# Patient Record
Sex: FEMALE | Race: ASIAN | NOT HISPANIC OR LATINO
[De-identification: names, ages, dates, MRNs, and addresses within clinical notes are randomized per-mention and may not be internally consistent; named-entity substitution may affect disease eponyms.]

---
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
## 2023-01-04 ENCOUNTER — RX RENEWAL (OUTPATIENT)
Age: 18
End: 2023-01-04

## 2023-01-16 NOTE — ED PEDIATRIC TRIAGE NOTE - ACCOMPANIED BY
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 and echocardiogram were recommended. Additionally, a consultation with cardiology was recommended. A referral was previously provided and testing was ordered. The patient again reported having a couple of episodes of tachycardia since her last visit. -- She has had a consultation with cardiology and wore a Holter monitor. --She was counseled to follow-up with cardiology.      Precautions were reviewed with the patient and she was counseled to go to the hospital with persistent or worsening symptoms or the development of any associated chest pain, shortness of breath, lightheadedness, dizziness, and/or syncope. 15. Anemia -- The patient's H/H on 9/26/2022 was noted to be 10.9/31.7. The patient reported having occasional symptoms of lightheadedness. -- A Baseline nutrition panel and thyroid function studies were completed on 9/26/2022. A hemoglobin electrophoresis, reticulocyte count were ordered. Her reticulocyte count was normal.  The hemoglobin electrophoresis was normal.  --The patient previously reported having intermittent symptoms of lightheadedness. She was counseled that this may be related to her anemia. Additional maternal evaluation was recommended with an EKG, echocardiogram, and consultation with cardiology as outlined above. --Precautions were reviewed with the patient. She was counseled to go to the hospital with persistent or worsening symptoms or the development of any chest pain, shortness of breath, tachycardia, or syncope. --Supplement as needed     16. Low vitamin B12 -- The patient's vitamin B12 level was borderline at 306. Individuals with vitamin B12 level between 200 and 400 are increased risk for anemia and side effects related to low vitamin B12. Thus, supplementation is recommended  --Recommend vitamin B12, 1000 mcg daily  --Monitor levels serially  --Repeat nutrition panel (CBC, CMP, magnesium, ferritin, folate, vitamin B12, vitamin D 25 [place]) in 4 weeks, on/after 10/24/2022     --Repeat testing completed 11/9/2022, results included: H/H 11.3/32.8, MCV 92.4, platelet count 434,310, potassium 3.8, creatinine 0.6, calcium 9.6, ALT 10, AST 13, ferritin 26, folate >20, vitamin B12 595, vitamin D 30, hemoglobin A1c 4.8%, TSH 0.998, free T4 0.89, free T3 2.8, uric acid 4.8, , magnesium 1.5, urinalysis contaminated, urine protein creatinine ratio 0.1, urine culture mixed, blood type B-, antibody screen negative. -- The patient's vitamin B12 was improved at 595.   She was counseled to continue her vitamin B12 supplement. --Recommend repeat testing in 4 to 6 weeks, on/after 12/7/2022     -- Repeat testing was completed on 12/16/2022, her results included: H/H 12/35.8, MCV 92.3, bili count 204,000, magnesium 1.7, potassium 4, creatinine 0.5, calcium 9, ALT 29, AST 20, ferritin 23, folate >20, vitamin B12 621, vitamin D 38, globin A1c 5.4%, TSH 1.67, free T4 0.6, free T3 3.1, uric acid 5, , TPO negative, antithyroglobulin antibody negative, urine protein creatinine ratio 0.1, urine culture mixed, hemoglobin electrophoresis pending, reticulocyte count normal.     -- The patient's vitamin B12 level has improved. She was counseled to continue her supplement. --Recommend repeat testing in 4 to 6 weeks, on/after 1/13/2023  --Repeat testing ordered  --Long-term follow-up with PCP for monitoring and management     17. Low ferritin -- The patient's ferritin was low at 29 (considered low if <15 in absence of anemia or <40 in setting of anemia)  --Ferrous sulfate 325 mg BID prescribed  --Monitor levels serially  --Monitor nutrition panel q4-6 weeks (CBC, CMP, magnesium, ferritin, folate, vitamin B12, vitamin D25OH), on/after 10/24/2022     --Repeat testing completed 11/9/2022, ferritin 26. Her H/H was stable at 11.3/32.  -- The patient was counseled to continue her oral iron supplement. --Recommend repeat testing in 4 to 6 weeks, on/after 12/7/2022     --Repeat testing completed 12/16/2022. Her ferritin level was stable at 23. --She was counseled to continue her iron supplement. --Recommend repeat testing in 4 to 6 weeks, on/after 1/13/2023  --Repeat testing ordered  --Follow up with PCP for long term monitoring and management     18.   Low vitamin D -- The patient's vitamin D was low at 31  --Recommend vitamin D3 2000 IU daily  --Monitor levels serially  --Monitor nutrition panel q4-6 weeks (CBC, CMP, magnesium, ferritin, folate, vitamin B12, vitamin D25OH)     --Repeat testing completed 11/9/2022, vitamin D 30  --The patient was encouraged to take her vitamin D supplement.  --Recommend repeat testing in 4 to 6 weeks, on/after 12/7/2022.     -- Repeat testing completed 12/16/2022, her vitamin D level was stable at 38.  She was counseled to continue her vitamin D supplement.  --Recommend repeat testing in 4 to 6 weeks, on/after 1/13/2023  --Repeat testing ordered  --Follow up with PCP for long term monitoring and management     19.  Low magnesium -- The patient's magnesium was mildly decreased at 1.5 (1.6-2.6).  Her potassium was normal at 3.8.  She was prescribed magnesium oxide, 400 mg daily.  Recommend repeat testing with next set of labs.  --Repeat testing completed 12/16/2022.  Her magnesium level was improved at 1.7.  She was counseled to continue her supplement.  --Repeat testing ordered     20.   Hypothyroxinemia -- The patient's lab results were reviewed.  Her free T4 was mildly decreased at 0.89.  Her TSH was normal at 0.998 and free T3 normal at 2.8.  Screening for thyroid peroxidase antibody and antithyroglobulin antibody was previously negative on 9/26/2022.  Counseling was provided to the patient.     --Counseling was previously provided to the patient.  Counseling was reviewed.  She did not have any additional questions or concerns.     Per the American thyroid Association, treatment is not recommended for women with isolated hypothyroxinemia. However, thyroid function study should be monitored closely, every 4 weeks throughout the pregnancy.  --Recommend repeat thyroid function studies in 4 weeks, on/after 12/7/2022     --Repeat testing completed 12/16/2022, her results included: TSH 1.67, free T4 0.86, free T3 3.1.  --Her free T4 is again mildly decreased.  Her TSH and free T3 are normal.  --Recommend repeat testing in 4 to 6 weeks, on/after 1/13/2023.  --Repeat testing ordered  --Long-term follow-up with PCP for monitoring management        21. Pressure with  voiding/dysuria, improved -- The patient previously had complaints of dysuria and increased pressure with voiding. She denied any associated fevers, chills, nausea, vomiting, and or back pain. The patient had a urine culture on 9/26/2022 that was reported as mixed. Secondary to the patient's symptoms, a prescription for Macrobid was provided. Today, the patient again reports that her symptoms have improved. She again reports intermittent symptoms of pressure but feels it is related to the pregnancy. Infection precautions were reviewed. Precautions were again reviewed and the patient was counseled to go to the hospital with persistent or worsening symptoms or the development of any associated fevers, chills, nausea, vomiting, and/or back pain. 22.  Upper respiratory infection, improved -- Previously, on 10/13/2022, the patient had complaints of upper respiratory infection symptoms. She reports that her symptoms started on Tuesday, 10/4/2022. She has complaints of congestion and a nonproductive cough. She also reports having a scratchy throat. She denied having any associated fevers, chills, nausea, and or vomiting. She denied having any loss of taste or smell. At 24 weeks gestation, the patient again had complaints of congestion. She reports her symptoms started 2 days ago. She denied having any associated fevers, chills, nausea, vomiting, chest pain, and/or shortness of breath. She denied having any associated loss of taste or smell. Supportive measures were again reviewed including using Tylenol as needed for pain and fever, saline nasal spray for congestion, Robitussin (plain) for cough, a humidifier or vaporizer, and throat lozenges and/or spray. A handout reviewing medication safety in pregnancy was provided. Today, the patient again reports that her symptoms have improved.      Precautions were reviewed with the patient and she was counseled that if she develops a fever greater than 100.4 F, chest pain and/or shortness of breath to present immediately for evaluation. The patient expressed verbal understanding of this counseling. 23.  Lump under skin -- The patient previously had concerns regarding a small, pea-sized lump along the right side of her abdominal wall. The patient reports that the lump is at the site of a Lovenox injection. The patient reports that her symptoms are stable. The patient was counseled that sometimes small knots or lumps can develop at the site of Lovenox injections. She was counseled to avoid massaging the area after administering the Lovenox. She was counseled to hold pressure after the injection. 24.  Abdominal wall hernia -- The patient again had concerns regarding a possible hernia at the site of her prior ileostomy. She reports that the area occasionally bulges and is sometimes tender. The patient was counseled that she may have a hernia in that area. With persistent or worsening symptoms, I would recommend referral to general surgery for further evaluation. She was counseled that she can wear gentle compression to help minimize the risk for bowel herniation. Precautions were reviewed and she was counseled to present to the hospital with the development of persistent pain, fever, nausea, and or vomiting. The patient was provided with a referral to general surgery. The patient met with Dr. Mia Arauz on 11/3/2022. Per his consultation note, she has a small incisional hernia at the site of her prior ileostomy. No obstruction was noted. Precautions were reviewed. Dr. 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 Based on her history, she may be at increased risk for having a bowel obstruction. Precautions were reviewed. 25. Occasional headaches, stable -- The patient again reports that she is experiencing occasional headaches.   She denies having any associated vision change, chest pain, shortness of breath, nausea, and or vomiting. She denies having the worst headache of her life or any associated neurologic deficits. Today, the patient again reports that her symptoms are stable. The patient was again counseled to stay well-hydrated. She can use Tylenol as needed. She was counseled regarding the use of magnesium oxide 400 mg twice daily and riboflavin 100 mg daily in reducing the frequency and intensity of migraine headaches. Precautions were reviewed, and she was counseled that if she develops the worst headache of her life or a headache with neurological deficits, to present immediately for evaluation. She expressed verbal understanding of this counseling. 26. MTHFR C677T, heterozygote --  The patient had a thrombophilia panel secondary to a history of a DVT. She was found to be heterozygous for MTHFR C677T. Counseling was previously provided regarding the implications and management of this gene mutation in pregnancy. Counseling was reviewed. She did not have any additional questions or concerns. She was counseled that this gene mutation affects folic acid metabolism. It is fairly common and found in 20-30% of the population. The patient was counseled that this condition is no longer thought to be clinically significant. It is generally only a problem if homocysteine levels are elevated. The patient's homocystine level was normal at 5.3 on 9/26/2022. In the past, this gene mutation was thought to be associated with increased obstetric risks including thrombosis, early recurrent pregnancy loss, placental abruption, hypertensive disorders of pregnancy, poor fetal growth, and fetal loss. More recent studies did not report strong associations with these risks. Because this genetic mutation affects folic acid metabolism, there is an increased risk for folic acid deficiency and other nutritional deficiencies in women with this condition.  There is also an increased risk for having a child with an open neural tube defect in women with this mutation, especially if they're homozygous for the C677T mutation. Generally, additional vitamin supplementation is recommended with folic acid or methyl folate, vitamin B6, and vitamin B12. The patient was counseled to take a low-dose aspirin. Fetal growth should be followed serially. She was counseled that there is a 50% chance that she will pass this mutation off to her child(joana). She should relay this information to the pediatrician who cares for her child(joana). She was also encouraged to review this diagnosis with her PCP. 27.  Vaginal discharge, improved -- The patient previously had complaints of increased vaginal discharge during her visit at 22 weeks 3 days. She denied having any associated itching or irritation. She felt that she may have a yeast infection. Thus, a sterile speculum exam was completed on 11/10/2022. Her cervix appeared closed. Copious discharge was noted. A genital culture was collected and noted to be negative. The patient again had complaints of copious vaginal discharge. A sterile speculum exam was repeated on 11/17/2022. Infection screening was completed with GC/chlamydia, Ureaplasma, and a repeat genital culture. --Screening for GC and chlamydia was negative. A genital culture was negative. Screening for Ureaplasma and mycoplasma was pending. Secondary to the continued vaginal discharge and borderline cervical length, the patient was empirically treated with a course of Flagyl. A prescription was previously provided. The patient reported that she tolerated the medication well. The patient again reports that her symptoms are stable/improving. 28.  Borderline cervical length -- The ultrasound results were reviewed with the patient. At 22 weeks 3 days, the patient's cervical length was borderline and measured 2.8-3.5 cm without funneling.   No dynamic change was noted. Secondary to the patient's complaints of increased discharge, A sterile speculum exam was done prior to her transvaginal ultrasound. The patient's cervix was visually closed. Only a general culture was collected. At 23 weeks 3 days, the patient's cervix appeared stable and measured 2.6-2.9 cm without funneling. A sterile speculum exam was repeated at 23 weeks 3 days. The patient's cervix was visually closed. Copious discharge was noted. Infection screening was completed. On digital exam, her cervix was closed with approximately 1-2 cm of length palpable. Her cervix was firm and posterior. A transvaginal ultrasound was repeated at 24 weeks gestation. The patient's cervix appeared normal and measured 2.8-3.6 cm without funneling. A transvaginal ultrasound was repeated at 26 weeks 4 days. The patient's cervix appeared stable and measured 3.2-3.3 cm without funneling. A transvaginal ultrasound was repeated at 28 weeks gestation. The patient's cervix appeared stable and measured 2.7-2.9 cm without funneling. A transvaginal ultrasound was repeated at 30 weeks 2 days. The patient's cervix appeared stable and measured 2.4-2.5 cm without funneling. A transvaginal ultrasound was repeated at 32 weeks 0 days. The patient's cervix appeared stable and measured 2.6 cm without funneling. She again notes good fetal movement and denies any symptoms of discharge, leaking of fluid, vaginal bleeding, and/or contractions. She was counseled that  cervical shortening is associated with a 30% increased risk for delivery prior to 37 weeks' gestation. Interventions and strategies to reduce this risk were reviewed. The patient was counseled that with further cervical shortening, Prometrium would be indicated. The risks and benefits of use were reviewed. She was counseled that vaginal progesterone has been shown to reduce this risk by 30-40%.   The risks and benefits of use were reviewed. --The patient requested treatment with vaginal progesterone. A prescription was provided. Instructions for use were reviewed. --She was provided with a prescription for 200 mg to be placed into the vagina at bedtime. She should continue this medication until 36-37 weeks' gestation. --She reports that she is tolerating the vaginal progesterone well. --Continue vaginal progesterone until 36-37 weeks gestation. At this time, close follow-up was recommended. The patient was scheduled to return in 2 weeks for a follow-up cervical length. The patient was counseled to avoid heavy lifting, prolonged standing, and sexual intercourse. The patient was scheduled to return for a follow-up assessment in 2 weeks. Precautions to call and/or return sooner were reviewed. 29.  Abnormal glucose test -- The patient reports that she recently completed a 2-hour oral glucose tolerance test.  Her results included 113/143/141. Her fasting value was elevated. However, the patient reports that she was not truly fasting. --Blood work is ordered for the patient today. A CMP was ordered. The patient was counseled to fast for testing. --If her fasting blood sugar is normal, then it is unlikely that the gestational diabetes. --If there is any question, the patient can repeat the 2-hour oral glucose tolerance test.     --The patient completed a fasting CMP on 12/16/2022. I initially reviewed the results and saw a glucose value of 86. This would be normal.  However, after reviewing her results again, the result of 86 was from testing done in November. Her result on 12/16/2022 was 96 which would be elevated for a 2 or 3-hour oral glucose tolerance test.     Thus, I recommended a referral for diabetic education and blood sugar monitoring. I contacted the patient regarding this error.   She was provided with a referral to diabetic education and a prescription for testing supplies. 27.  Gestational diabetes -- The patient glucose screening results were reviewed. This information is summarized above under issue #29. The patient had her blood sugar log available for review. Dates reviewed included -. Fastin-97 with 3 of 13 values elevated  Post breakfast:  with 4 of 11 values elevated  Post lunch:  with 3 of 11 values elevated  Post dinner:  with 8 of 13 values elevated    The patient met with diabetic education on 2023. Counseling was previously provided. Counseling was reviewed. She did not have any additional questions or concerns. She was counseled regarding the implications of gestational diabetes in pregnancy including an increased risk of hypertensive disorders of pregnancy, an increased risk for , fetal macrosomia, an increased risk for maternal and/or fetal trauma at time of delivery (in the setting of macrosomia),   delivery, and possibly and increased risk for fetal loss. Additional  risk were discussed including  hypoglycemia, hypocalcemia/hypomagnesemia, jaundice, and respiratory distress. She was counseled that these risks can be reduced with improved glycemic control. Goals for glycemic control were reviewed including fasting of 60-95 mg/dL and a 2 hour postprandial value of <120 mg/dL. At this time, I recommend that the patient continues to follow a gestational diabetic diet. If at any time >50% of her values are above the goals outlined, then medical therapy would be indicated. [facility] Options for medical treatment include insulin or glyburide or metformin. She should continue to check fingersticks four times daily, fasting and 2 hour postprandial.       Secondary to the increased risk for hypertensive disorders of pregnancy, a daily low-dose aspirin was recommended. The risks and benefits of low-dose aspirin use in pregnancy were reviewed.   The patient should take 81 mg of aspirin daily for the remainder of pregnancy and 6 to 8 weeks postpartum. The patient was counseled to monitor fetal kick counts daily. Instructions were reviewed. If the patient remains diet controlled, increased surveillance with twice weekly fetal testing is recommended at 34-36 weeks' gestation with delivery at 39-40 weeks' gestation. If she requires medical therapy, then twice weekly testing would be indicated sooner with delivery at 44 week's gestation. Fetal growth should be monitored every 3-4 weeks until delivery. During labor, the patient's blood sugars should be monitored closely and ideally be less than 105 mg/dL during labor in order to minimize the risk of  hypoglycemia. She should also have a fasting sugar checked postpartum. She was counseled that she has a 50% risk for the development of type 2 diabetes in the next 10 years. This risk can be modified with diet and lifestyle changes. She will need a 2 hour oral glucose tolerance test at 8-12 weeks postpartum. If this is normal, she should have yearly screening for diabetes. If she becomes pregnant in the future, she is at increased risk for recurrent gestational diabetes, 60-70%, and should have early screening for gestational diabetes in the late first or early second trimester. 31.  History of elevated blood pressure -- The patient's blood pressure was initially elevated at 143/84 at 28 weeks gestation. Her blood pressure was repeated normal 113/73. The patient's urine dip was negative for protein. The patient denies having any other prior blood pressure elevations during this pregnancy. She denies having any symptoms of headache, vision change, chest pain, shortness of breath, nausea, vomiting, and or right upper quadrant pain. An exam was done in the office. The patient's heart had a regular rate and rhythm. Her lungs were clear to auscultation bilaterally.   Her abdomen was soft, gravid, non tender, and with normal bowel sounds. She had +1 patellar reflexes bilaterally. No clonus was noted bilaterally. She had trace edema in her bilateral lower extremities. The patient's blood pressure was normal today at 126/82. Her urine was negative for protein. The patient was counseled that at this time, continue close monitoring is recommended. Again, the patient has a history of hypertension. Continue increased maternal and fetal surveillance as outlined above. 32.  Poor fetal growth, improved -- The ultrasound findings from 30 weeks 2 days were reviewed with the patient. Overall, the estimated fetal weight is at the 40th percentile, however the abdominal circumference is measuring at the 7th percentile. Fetal growth was reevaluated at 32 weeks gestation. The overall estimate of fetal weight was 3 pounds 14 ounces, 44 percentile. [facility] was at the 11th percentile. In the past 2 weeks, the overall estimated fetal weight is increased by 330 g (expected 200 g). [facility] has increased by 2 cm (expected 2 cm). Fetal testing was reassuring. Counseling was previously provided to patient. Counseling was reviewed. The patient did not have any additional questions or concerns. Again, the overall estimated fetal weight is greater than the 10th percentile, however the abdominal circumference measures less than the 10th percentile at 30 weeks 2 days. Reassuring findings included a normal amniotic fluid index, a biophysical profile score of 8/8, and normal Doppler studies. The patient was counseled that typically fetal growth restriction is formally diagnosed when the overall estimated fetal weight is less than the 10th percentile.  However, a decline in the overall estimated fetal weight of greater than 20% and/or an abdominal circumference that measures less then the 10th percentile are findings that are also concerning for and/or consistent with fetal growth restriction. In over 70% of cases, small fetal size is constitutional. In approximately 30% of cases, there is a secondary cause related to placental insufficiency, a fetal issue, and/or an underlying maternal condition. Risk factors were reviewed with the patient including malnutrition, maternal chronic diseases (ie SLE, renal disease, antiphospholipid antibodies, anemia, diabetes), placental disease (chorioangioma, mosaicism), infections, fetal aneuploidy, and teratogen exposure. She was counseled regarding general management plans and that mild IUGR can generally be expectantly managed until 37-39 weeks' gestation. If there is severe growth restriction, delivery timing is based on the point at which the risk of fetal death exceeds that of  death. There is an increased risk for fetal loss in the setting of growth restriction, thus, increased surveillance is indicated. The best predictors of outcome are fetal size and gestational age attained. Overall, the long term outcome depends on the etiology of the poor growth. At this time, I recommend increased fetal surveillance. The patient was counseled to monitor fetal kick counts daily. Instructions were reviewed. She was scheduled to return in 2 weeks for follow-up fetal growth assessment and testing. Additional recommendations will be made at that time. Given the concern for poor fetal growth, additional maternal evaluation was recommended. The following labs were ordered: Preeclampsia screening, antiphospholipid antibodies, thyroid function studies/thyroid peroxidase antibodies, a nutrition panel, and infection studies. -- Testing was ordered today     Her prenatal records were reviewed and she had early screening with cell free DNA that was low risk for aneuploidy. Given the lag in fetal growth, a low dose aspirin was recommended. She was counseled to start 81 mg of aspirin daily. The risks and benefits were discussed.   The patient was counseled to continue a daily low-dose aspirin as outlined above. 33. Shortness of breath with activities -- The patient had reports of increased shortness of breath primarily with activity. She denied having any associated chest pain, tachycardia, heart palpitations, lightheadedness, dizziness, and/or syncope. The patient was counseled that her symptoms are likely secondary to the pregnancy. With worsening symptoms or the development of any associated cardiac symptoms, then additional maternal evaluation will be recommended. Precautions were reviewed with the patient and she was counseled to present to the hospital with persistent/worsening symptoms or the development of associated tachycardia, heart palpitations, chest pain, lightheadedness, dizziness, and/or syncope. --I requested the patient return for a follow-up assessment in 1 week unless there is a clinical reason for her to return prior to that time. She is to call if she has any problems or questions prior to her next visit. Further evaluation and management will be dependent on her clinical presentation and the results of her testing. --The patient was advised to call if she has any increased vaginal discharge, vaginal bleeding, contractions, abdominal pain, back pain or any new significant symptomatology prior to her next visit. I advised her that these are signs and symptoms of cervical change and require follow-up assessment when they occur. Preeclampsia precautions were also reviewed with the patient. --The patient was also counseled to call and/or return with any concerns for decreased fetal activity. --The patient is to continue to follow with you in your office for ongoing obstetric care. --The total time spent on today's visit was 30 minutes.   This included preparation for the visit (i.e. reviewing prior external notes and test results), performance of a medically appropriate history and examination, counseling, orders for medications, tests or other procedures, and coordination of care. Greater than 50% of the time was spent face-to-face with the patient. This time is exclusive of procedures performed. I answered all of  the patient's questions to her satisfaction. I asked her to call if she had any additional questions prior to her next visit. --At the conclusion of the visit, the patient appeared to have a good understanding of the issues discussed. I answered all of her questions to her satisfaction. I asked her to call if she had any additional questions prior to her next visit. --Thank you for allowing me to participate in the care of this pleasant patient. Please don't hesitate to call me if you have any questions. Sincerely,      Epi Yanes MD, [facility] [address]  [phone]      *All or parts of this note may have been generated using a voice recognition program. There may be typo, grammar, or Word substitution errors that have escaped my review of this note.

## 2023-01-30 ENCOUNTER — APPOINTMENT (OUTPATIENT)
Dept: FAMILY MEDICINE | Facility: CLINIC | Age: 18
End: 2023-01-30
Payer: COMMERCIAL

## 2023-01-30 ENCOUNTER — TRANSCRIPTION ENCOUNTER (OUTPATIENT)
Age: 18
End: 2023-01-30

## 2023-01-30 VITALS
WEIGHT: 210 LBS | SYSTOLIC BLOOD PRESSURE: 115 MMHG | BODY MASS INDEX: 38.64 KG/M2 | HEART RATE: 103 BPM | HEIGHT: 62 IN | TEMPERATURE: 98.2 F | OXYGEN SATURATION: 98 % | DIASTOLIC BLOOD PRESSURE: 78 MMHG

## 2023-01-30 DIAGNOSIS — M25.50 PAIN IN UNSPECIFIED JOINT: ICD-10-CM

## 2023-01-30 PROCEDURE — 36415 COLL VENOUS BLD VENIPUNCTURE: CPT

## 2023-01-30 PROCEDURE — 99214 OFFICE O/P EST MOD 30 MIN: CPT | Mod: 25

## 2023-01-30 RX ORDER — PSYLLIUM HUSK (WITH SUGAR) 3 G/12 G
28.3 POWDER (GRAM) ORAL TWICE DAILY
Qty: 1 | Refills: 0 | Status: DISCONTINUED | COMMUNITY
Start: 2022-06-29 | End: 2023-01-30

## 2023-01-30 RX ORDER — DOCUSATE SODIUM 100 MG/1
100 CAPSULE, LIQUID FILLED ORAL 3 TIMES DAILY
Qty: 100 | Refills: 1 | Status: DISCONTINUED | COMMUNITY
Start: 2022-06-29 | End: 2023-01-30

## 2023-01-30 NOTE — HISTORY OF PRESENT ILLNESS
[Parent] : parent [FreeTextEntry1] : Here for follow-up; episodes of abdominal pain, heart racing, anxiety.  [de-identified] : Here for follow-up; episodes of abdominal pain, heart racing, anxiety. \par Here with mom. \slime Went to -last month was in December. \slime Not able to sleep was having throat pain-felt as if she was unable to swallow-crying for 4 days. \par \slime Then went to  in the beginning of January. Did home covid test, negative flu, covid, strep \par Went to another PCP-sent for imaging with [facility]. Had thyroid sonogram, enlarged thyroid region, but no nodules.\par  Was recommended to started prune juice for constipation.  \par \slime Withdrew from classes in November because of cough, chest pain, not moving as well.  Feels winded.    \slime [place] sick and reports she did not feel well enough to go to class and had to withdraw.  Patient reports she has given copies of her medical records to school from past years.  Not currently working with academic counselor. \par \slime Has upcoming appointment with therapist-next week first session and psychiatrist per mom.\par Feeling very anxious and angry.  Denies suicidal thoughts. Has not met with therapist previously. \par \par Also reports she food poisoning late december \par \slime Just started school this semester. Going Full time 15-18 credits. \par \slime Feels like ankles are bothering her and she has pain in the ankles at times. \par Also with back pain. \par \par Getting episodes of right upper quadrant pain frequently. \par \slime Wakes up at night time-with heart racing-and HR up to 140s on HR tracker-\par Saw cardiology last year.  \par \par Also reports nasal passages have been very dry, sometimes getting nosebleeds.

## 2023-01-30 NOTE — PHYSICAL EXAM
[No Acute Distress] : no acute distress [Well Developed] : well developed [Normal Oropharynx] : the oropharynx was normal [No Lymphadenopathy] : no lymphadenopathy [Soft] : abdomen soft [Non-distended] : non-distended [No Masses] : no abdominal mass palpated [Normal Bowel Sounds] : normal bowel sounds [Grossly Normal Strength/Tone] : grossly normal strength/tone [Alert and Oriented x3] : oriented to person, place, and time [Normal] : affect was normal and insight and judgment were intact [de-identified] : no sinus tenderness [de-identified] : Right upper quadrant tenderness to palpation [de-identified] : paraspinal muscle tension [de-identified] : ankle ROM intact

## 2023-01-30 NOTE — REVIEW OF SYSTEMS
[Nausea] : nausea [Constipation] : constipation [Insomnia] : insomnia [Anxiety] : anxiety [Fever] : no fever [Chills] : no chills [Earache] : no earache [Sore Throat] : no sore throat [Diarrhea] : diarrhea [Vomiting] : no vomiting [Dysuria] : no dysuria [Suicidal] : not suicidal [FreeTextEntry5] : feeling heart racing  [FreeTextEntry6] : has not needed albuterol the past few days  [FreeTextEntry7] : recurrent pain RUQ; constipation-drinking prune juice sometimes [de-identified] : feels angry; napping sometimes during the day up to a few hours at a time

## 2023-01-30 NOTE — PLAN
[FreeTextEntry1] : Will follow up labwork drawn in office today.\par Check inflammatory markers for joint pain. \par \par RUQ-repeat Abdominal US.  \par Constipation-discussed bowel regimen, regular fiber, hydration.\par \par Elevated HR-had negative cardiac workup last year. \par Mood-Anxiety/Depression-had tried medication previously-without success; has upcoming therapy and Psychiatric appointment in next week. \par \par Discussed meeting with academic counselor at school. \par \par Back Pain-PT and Ortho evaluation.\par Advised to try trial of heat/warm compresses for muscle tenderness as needed.  Advised to cover compress, not place directly on skin and not to apply for more than 15 minutes at a time.  Patient expressed understanding.\par \par Dry nasal passages with nosebleeds at times-take break from allergy medication; start saline nasal spray. \par \par Will adjust meds based on labs. \par Patient expressed understanding of plan.\par

## 2023-02-06 ENCOUNTER — RX RENEWAL (OUTPATIENT)
Age: 18
End: 2023-02-06

## 2023-02-15 LAB
ALBUMIN SERPL ELPH-MCNC: 4.1 G/DL
ALP BLD-CCNC: 112 U/L
ALT SERPL-CCNC: 18 U/L
ANION GAP SERPL CALC-SCNC: 12 MMOL/L
AST SERPL-CCNC: 21 U/L
BASOPHILS # BLD AUTO: 0.04 K/UL
BASOPHILS NFR BLD AUTO: 0.6 %
BILIRUB SERPL-MCNC: 0.4 MG/DL
BUN SERPL-MCNC: 9 MG/DL
CALCIUM SERPL-MCNC: 10.1 MG/DL
CHLORIDE SERPL-SCNC: 105 MMOL/L
CHOLEST SERPL-MCNC: 174 MG/DL
CO2 SERPL-SCNC: 21 MMOL/L
CREAT SERPL-MCNC: 0.74 MG/DL
CRP SERPL-MCNC: 7 MG/L
EGFR: 119 ML/MIN/1.73M2
EOSINOPHIL # BLD AUTO: 0.13 K/UL
EOSINOPHIL NFR BLD AUTO: 2.1 %
ERYTHROCYTE [SEDIMENTATION RATE] IN BLOOD BY WESTERGREN METHOD: 17 MM/HR
ESTIMATED AVERAGE GLUCOSE: 105 MG/DL
FERRITIN SERPL-MCNC: 8 NG/ML
FOLATE SERPL-MCNC: 19 NG/ML
GLUCOSE SERPL-MCNC: 86 MG/DL
HBA1C MFR BLD HPLC: 5.3 %
HCT VFR BLD CALC: 42 %
HDLC SERPL-MCNC: 44 MG/DL
HGB BLD-MCNC: 13.6 G/DL
IMM GRANULOCYTES NFR BLD AUTO: 0.2 %
IRON SATN MFR SERPL: 22 %
IRON SERPL-MCNC: 109 UG/DL
LDLC SERPL CALC-MCNC: 109 MG/DL
LYMPHOCYTES # BLD AUTO: 2.09 K/UL
LYMPHOCYTES NFR BLD AUTO: 33.5 %
MAN DIFF?: NORMAL
MCHC RBC-ENTMCNC: 25.5 PG
MCHC RBC-ENTMCNC: 32.4 GM/DL
MCV RBC AUTO: 78.8 FL
MONOCYTES # BLD AUTO: 0.53 K/UL
MONOCYTES NFR BLD AUTO: 8.5 %
NEUTROPHILS # BLD AUTO: 3.44 K/UL
NEUTROPHILS NFR BLD AUTO: 55.1 %
NONHDLC SERPL-MCNC: 130 MG/DL
PLATELET # BLD AUTO: 320 K/UL
POTASSIUM SERPL-SCNC: 4.7 MMOL/L
PROT SERPL-MCNC: 7.2 G/DL
RBC # BLD: 5.33 M/UL
RBC # FLD: 13.4 %
RHEUMATOID FACT SER QL: <10 IU/ML
SODIUM SERPL-SCNC: 139 MMOL/L
TIBC SERPL-MCNC: 504 UG/DL
TRANSFERRIN SERPL-MCNC: 420 MG/DL
TRIGL SERPL-MCNC: 102 MG/DL
TSH SERPL-ACNC: 1.65 UIU/ML
UIBC SERPL-MCNC: 395 UG/DL
VIT B12 SERPL-MCNC: 363 PG/ML
WBC # FLD AUTO: 6.24 K/UL

## 2023-02-21 ENCOUNTER — TRANSCRIPTION ENCOUNTER (OUTPATIENT)
Age: 18
End: 2023-02-21

## 2023-02-22 ENCOUNTER — APPOINTMENT (OUTPATIENT)
Dept: FAMILY MEDICINE | Facility: CLINIC | Age: 18
End: 2023-02-22
Payer: COMMERCIAL

## 2023-02-22 DIAGNOSIS — R10.11 RIGHT UPPER QUADRANT PAIN: ICD-10-CM

## 2023-02-22 PROCEDURE — 99214 OFFICE O/P EST MOD 30 MIN: CPT | Mod: 95

## 2023-02-22 NOTE — REVIEW OF SYSTEMS
[Fever] : no fever [Chills] : no chills [Shortness Of Breath] : no shortness of breath [Cough] : no cough [Muscle Pain] : muscle pain [Back Pain] : back pain [Negative] : Gastrointestinal [FreeTextEntry5] : heart racing [de-identified] : not sleeping

## 2023-02-22 NOTE — HISTORY OF PRESENT ILLNESS
[Home] : at home, [unfilled] , at the time of the visit. [Medical Office: ([facility])___] : at the medical office located in  [Verbal consent obtained from patient] : the patient, [unfilled] [FreeTextEntry1] : Here for follow-up, review labs.  [de-identified] : Here for follow-up, review labs. \par Patient notes she did not sleep for the past 2 nights-feels her heart racing again up to 140s. \par No URI symptoms, no GI symptoms. \par Back and legs have been bothering her-notes she has leg pain for a long time, remembers having it when she was younger. \par Has tried muscle cream the other day for her back, and massager today which helped. \par \par Met with therapist 2 sessions so far-missed this week.  Has not met with Psychiatrist yet. Notes does not have appt scheduled yet. \[facility] \par \par Patient was able to meet with academic counselor at school; notes this semester has been going okay so far.  Did not go to class this week. \slime Met with sleep doctor-going for PFTs. \par \par Patient concerned about her weight-wants to know if weight loss will help with her back. \slime Had met with endocrine in 2021.  Notes she met with nutritionist previously.  \slime

## 2023-02-22 NOTE — PHYSICAL EXAM
[No Acute Distress] : no acute distress [Well Developed] : well developed [Normal] : affect was normal and insight and judgment were intact [de-identified] : No visible respiratory distress [de-identified] : No visible rash

## 2023-02-22 NOTE — PLAN
[FreeTextEntry1] : Labs reviewed with patient-advised of elevated inflammatory markers-discussed rheumatology referral. \par History of low iron-not anemic currently; has required iv iron infusions in the past-discussed hematology follow-up. Patient notes she is intaking iron rich foods. \par Sleep Hygiene discussed, consistent sleep and wake schedule, discussed following up with therapist this week. \par Avoid caffeine, sugary/sweet items before bedtime. \par \par Back Pain-reports relief with massager-reissued PT and Ortho follow-up. \par \par Discussed weight-and endocrine consult for possible medical weight management. \par Discussed with MsCrispin FERRERAMILAGRO svetlana and advised to go to seek immediate medical re-evaluation if condition worsened.  Ms. ALEXANDRO CACHORRO expressed understanding of the plan. \par \par

## 2023-03-13 ENCOUNTER — APPOINTMENT (OUTPATIENT)
Dept: PEDIATRIC GASTROENTEROLOGY | Facility: CLINIC | Age: 18
End: 2023-03-13
Payer: COMMERCIAL

## 2023-03-13 VITALS
HEIGHT: 63.07 IN | WEIGHT: 223.33 LBS | BODY MASS INDEX: 39.57 KG/M2 | SYSTOLIC BLOOD PRESSURE: 106 MMHG | HEART RATE: 112 BPM | DIASTOLIC BLOOD PRESSURE: 75 MMHG

## 2023-03-13 DIAGNOSIS — K21.9 GASTRO-ESOPHAGEAL REFLUX DISEASE W/OUT ESOPHAGITIS: ICD-10-CM

## 2023-03-13 DIAGNOSIS — K59.09 OTHER CONSTIPATION: ICD-10-CM

## 2023-03-13 DIAGNOSIS — G89.29 LEFT LOWER QUADRANT PAIN: ICD-10-CM

## 2023-03-13 DIAGNOSIS — R10.33 PERIUMBILICAL PAIN: ICD-10-CM

## 2023-03-13 DIAGNOSIS — K76.0 FATTY (CHANGE OF) LIVER, NOT ELSEWHERE CLASSIFIED: ICD-10-CM

## 2023-03-13 DIAGNOSIS — R10.32 LEFT LOWER QUADRANT PAIN: ICD-10-CM

## 2023-03-13 DIAGNOSIS — K80.20 CALCULUS OF GALLBLADDER W/OUT CHOLECYSTITIS W/OUT OBSTRUCTION: ICD-10-CM

## 2023-03-13 PROCEDURE — 99214 OFFICE O/P EST MOD 30 MIN: CPT

## 2023-03-16 ENCOUNTER — NON-APPOINTMENT (OUTPATIENT)
Age: 18
End: 2023-03-16

## 2023-03-16 DIAGNOSIS — K80.20 CALCULUS OF GALLBLADDER W/OUT CHOLECYSTITIS W/OUT OBSTRUCTION: ICD-10-CM

## 2023-03-24 ENCOUNTER — RX RENEWAL (OUTPATIENT)
Age: 18
End: 2023-03-24

## 2023-04-19 ENCOUNTER — APPOINTMENT (OUTPATIENT)
Dept: FAMILY MEDICINE | Facility: CLINIC | Age: 18
End: 2023-04-19

## 2023-05-08 ENCOUNTER — APPOINTMENT (OUTPATIENT)
Dept: FAMILY MEDICINE | Facility: CLINIC | Age: 18
End: 2023-05-08
Payer: COMMERCIAL

## 2023-05-08 VITALS
TEMPERATURE: 97.8 F | SYSTOLIC BLOOD PRESSURE: 109 MMHG | HEART RATE: 113 BPM | HEIGHT: 63 IN | BODY MASS INDEX: 38.98 KG/M2 | DIASTOLIC BLOOD PRESSURE: 70 MMHG | OXYGEN SATURATION: 98 % | WEIGHT: 220 LBS

## 2023-05-08 DIAGNOSIS — R42 DIZZINESS AND GIDDINESS: ICD-10-CM

## 2023-05-08 DIAGNOSIS — M79.10 MYALGIA, UNSPECIFIED SITE: ICD-10-CM

## 2023-05-08 DIAGNOSIS — F41.9 ANXIETY DISORDER, UNSPECIFIED: ICD-10-CM

## 2023-05-08 DIAGNOSIS — E61.1 IRON DEFICIENCY: ICD-10-CM

## 2023-05-08 PROCEDURE — 99214 OFFICE O/P EST MOD 30 MIN: CPT

## 2023-05-08 RX ORDER — EPINEPHRINE 0.3 MG/.3ML
0.3 INJECTION INTRAMUSCULAR
Qty: 1 | Refills: 6 | Status: ACTIVE | COMMUNITY
Start: 2021-12-06 | End: 1900-01-01

## 2023-05-08 NOTE — REVIEW OF SYSTEMS
[Constipation] : constipation [Anxiety] : anxiety [Fever] : no fever [Chills] : no chills [Chest Pain] : no chest pain [Wheezing] : no wheezing [Cough] : no cough [Diarrhea] : diarrhea [Dysuria] : no dysuria [FreeTextEntry6] : dry cough with cold liquid [FreeTextEntry7] : vomitting 3x last week [FreeTextEntry9] : feeling numbness and in shoulders and legs [de-identified] : off balance [de-identified] : really does not like school; always had a good average

## 2023-05-08 NOTE — PHYSICAL EXAM
[No Acute Distress] : no acute distress [Well Developed] : well developed [Normal Sclera/Conjunctiva] : normal sclera/conjunctiva [PERRL] : pupils equal round and reactive to light [EOMI] : extraocular movements intact [Normal Oropharynx] : the oropharynx was normal [No Lymphadenopathy] : no lymphadenopathy [No Respiratory Distress] : no respiratory distress  [Regular Rhythm] : with a regular rhythm [No Edema] : there was no peripheral edema [No Extremity Clubbing/Cyanosis] : no extremity clubbing/cyanosis [Normal] : affect was normal and insight and judgment were intact

## 2023-05-08 NOTE — HISTORY OF PRESENT ILLNESS
[Parent] : parent [FreeTextEntry1] : Here for follow-up [de-identified] : Here for follow-up\par Here with mom. \par Feeling off balance when standing-worse over the past few weeks. \par No actual falls. 2 weeks ago rolled ankle because of uneven sidewalk; bumped head on car-was okay. \par Was having headaches. \par Went to Neurology.  \par Saw Neurologist last Friday. Did Doppler; planning for MRI\par Was getting bad headaches.  \par Dr. Stef Moran-[place]-Neurology.  \par \par Leg pain and numbness. Both legs are bothering her.  Per patient Neurologist is planning on doing nerve test. \par Bilateral hip pain as well.  Using compression socks helped per mom.\par \par \par \par Has been meeting therapist; biweekly; not feeling like it is helping. \par Did not meet with psychiatrist.  Reports bad reaction medications previously. \par \par School is going well; grades are good.  Doing well this semester; mom and patient report she has always done well in school; patient reports she hates going to school.  \par \par Was sick last month-stomach symptoms-last month.  \par Due for period in 1 or 2 days. \par \par Planning to see Endocrinology. \par Not able to take MVI with iron. \par \par Due for Tdap. \par \par Saw GI-needs to transition to adult GI.  no known mental health issues.

## 2023-05-08 NOTE — PLAN
[FreeTextEntry1] : Patient declines bloodwork today.  Wants to get done at lab. \par \par Due for Tdap-can get at next visit or at pharmacy. \par \par Muscle Pain-recheck inflammatory Markers.\par Iron Deficiency-unable to tolerate oral iron-Hematology eval. \par \par Will be seeing Endocrine. \par Working with therapist.   \par Discussed Rheumatology eval if elevated markers and muscle pain. \par Patient and mom expressed understanding of plan.

## 2023-05-16 LAB
ALBUMIN SERPL ELPH-MCNC: 4.2 G/DL
ALP BLD-CCNC: 119 U/L
ALT SERPL-CCNC: 21 U/L
ANION GAP SERPL CALC-SCNC: 15 MMOL/L
AST SERPL-CCNC: 21 U/L
BASOPHILS # BLD AUTO: 0.03 K/UL
BASOPHILS NFR BLD AUTO: 0.4 %
BILIRUB SERPL-MCNC: 0.2 MG/DL
BUN SERPL-MCNC: 12 MG/DL
CALCIUM SERPL-MCNC: 9.3 MG/DL
CHLORIDE SERPL-SCNC: 107 MMOL/L
CHOLEST SERPL-MCNC: 166 MG/DL
CO2 SERPL-SCNC: 18 MMOL/L
CREAT SERPL-MCNC: 0.68 MG/DL
CRP SERPL-MCNC: 8 MG/L
EGFR: 128 ML/MIN/1.73M2
EOSINOPHIL # BLD AUTO: 0.13 K/UL
EOSINOPHIL NFR BLD AUTO: 1.6 %
ERYTHROCYTE [SEDIMENTATION RATE] IN BLOOD BY WESTERGREN METHOD: 16 MM/HR
ESTIMATED AVERAGE GLUCOSE: 108 MG/DL
FERRITIN SERPL-MCNC: 10 NG/ML
FOLATE SERPL-MCNC: 17.1 NG/ML
GLUCOSE SERPL-MCNC: 82 MG/DL
HBA1C MFR BLD HPLC: 5.4 %
HCT VFR BLD CALC: 42 %
HDLC SERPL-MCNC: 43 MG/DL
HGB BLD-MCNC: 13.3 G/DL
IMM GRANULOCYTES NFR BLD AUTO: 0.1 %
IRON SATN MFR SERPL: 16 %
IRON SERPL-MCNC: 81 UG/DL
LDLC SERPL CALC-MCNC: 94 MG/DL
LYMPHOCYTES # BLD AUTO: 2.54 K/UL
LYMPHOCYTES NFR BLD AUTO: 32.2 %
MAN DIFF?: NORMAL
MCHC RBC-ENTMCNC: 25 PG
MCHC RBC-ENTMCNC: 31.7 GM/DL
MCV RBC AUTO: 79.1 FL
MONOCYTES # BLD AUTO: 0.66 K/UL
MONOCYTES NFR BLD AUTO: 8.4 %
NEUTROPHILS # BLD AUTO: 4.52 K/UL
NEUTROPHILS NFR BLD AUTO: 57.3 %
NONHDLC SERPL-MCNC: 122 MG/DL
PLATELET # BLD AUTO: 311 K/UL
POTASSIUM SERPL-SCNC: 4.5 MMOL/L
PROT SERPL-MCNC: 7.4 G/DL
RBC # BLD: 5.31 M/UL
RBC # FLD: 13.4 %
RHEUMATOID FACT SER QL: <10 IU/ML
SODIUM SERPL-SCNC: 140 MMOL/L
TIBC SERPL-MCNC: 505 UG/DL
TRANSFERRIN SERPL-MCNC: 416 MG/DL
TRIGL SERPL-MCNC: 141 MG/DL
TSH SERPL-ACNC: 2.28 UIU/ML
UIBC SERPL-MCNC: 424 UG/DL
VIT B12 SERPL-MCNC: 351 PG/ML
WBC # FLD AUTO: 7.89 K/UL

## 2023-05-29 ENCOUNTER — OUTPATIENT (OUTPATIENT)
Dept: OUTPATIENT SERVICES | Facility: HOSPITAL | Age: 18
LOS: 1 days | Discharge: HOME | End: 2023-05-29

## 2023-05-29 DIAGNOSIS — D50.9 IRON DEFICIENCY ANEMIA, UNSPECIFIED: ICD-10-CM

## 2023-05-29 DIAGNOSIS — Z90.89 ACQUIRED ABSENCE OF OTHER ORGANS: Chronic | ICD-10-CM

## 2023-06-06 ENCOUNTER — RESULT REVIEW (OUTPATIENT)
Age: 18
End: 2023-06-06

## 2023-06-06 ENCOUNTER — APPOINTMENT (OUTPATIENT)
Dept: HEMATOLOGY ONCOLOGY | Facility: CLINIC | Age: 18
End: 2023-06-06
Payer: COMMERCIAL

## 2023-06-06 ENCOUNTER — NON-APPOINTMENT (OUTPATIENT)
Age: 18
End: 2023-06-06

## 2023-06-06 VITALS
TEMPERATURE: 96.4 F | SYSTOLIC BLOOD PRESSURE: 115 MMHG | WEIGHT: 225.97 LBS | RESPIRATION RATE: 17 BRPM | HEIGHT: 63.78 IN | OXYGEN SATURATION: 97 % | DIASTOLIC BLOOD PRESSURE: 82 MMHG | HEART RATE: 95 BPM | BODY MASS INDEX: 39.06 KG/M2

## 2023-06-06 LAB
BASOPHILS # BLD AUTO: 0.03 K/UL — SIGNIFICANT CHANGE UP (ref 0–0.2)
BASOPHILS NFR BLD AUTO: 0.6 % — SIGNIFICANT CHANGE UP (ref 0–2)
EOSINOPHIL # BLD AUTO: 0.12 K/UL — SIGNIFICANT CHANGE UP (ref 0–0.5)
EOSINOPHIL NFR BLD AUTO: 2.3 % — SIGNIFICANT CHANGE UP (ref 0–6)
HCT VFR BLD CALC: 43.5 % — SIGNIFICANT CHANGE UP (ref 34.5–45)
HGB BLD-MCNC: 14.1 G/DL — SIGNIFICANT CHANGE UP (ref 11.5–15.5)
IMM GRANULOCYTES NFR BLD AUTO: 0.2 % — SIGNIFICANT CHANGE UP (ref 0–0.9)
LYMPHOCYTES # BLD AUTO: 1.6 K/UL — SIGNIFICANT CHANGE UP (ref 1–3.3)
LYMPHOCYTES # BLD AUTO: 30.3 % — SIGNIFICANT CHANGE UP (ref 13–44)
MCHC RBC-ENTMCNC: 25.2 PG — LOW (ref 27–34)
MCHC RBC-ENTMCNC: 32.4 G/DL — SIGNIFICANT CHANGE UP (ref 32–36)
MCV RBC AUTO: 77.8 FL — LOW (ref 80–100)
MONOCYTES # BLD AUTO: 0.7 K/UL — SIGNIFICANT CHANGE UP (ref 0–0.9)
MONOCYTES NFR BLD AUTO: 13.3 % — SIGNIFICANT CHANGE UP (ref 2–14)
NEUTROPHILS # BLD AUTO: 2.82 K/UL — SIGNIFICANT CHANGE UP (ref 1.8–7.4)
NEUTROPHILS NFR BLD AUTO: 53.3 % — SIGNIFICANT CHANGE UP (ref 43–77)
NRBC # BLD: 0 /100 WBCS — SIGNIFICANT CHANGE UP (ref 0–0)
PLATELET # BLD AUTO: 253 K/UL — SIGNIFICANT CHANGE UP (ref 150–400)
RBC # BLD: 5.59 M/UL — HIGH (ref 3.8–5.2)
RBC # FLD: 13.7 % — SIGNIFICANT CHANGE UP (ref 10.3–14.5)
WBC # BLD: 5.28 K/UL — SIGNIFICANT CHANGE UP (ref 3.8–10.5)
WBC # FLD AUTO: 5.28 K/UL — SIGNIFICANT CHANGE UP (ref 3.8–10.5)

## 2023-06-06 PROCEDURE — 99205 OFFICE O/P NEW HI 60 MIN: CPT

## 2023-06-07 LAB
FERRITIN SERPL-MCNC: 9 NG/ML
IRON SATN MFR SERPL: 18 %
IRON SERPL-MCNC: 95 UG/DL
TIBC SERPL-MCNC: 522 UG/DL
UIBC SERPL-MCNC: 428 UG/DL

## 2023-06-07 NOTE — ASSESSMENT
[Supportive] : Goals of care discussed with patient: Supportive [Palliative Care Plan] : not applicable at this time [FreeTextEntry1] : Re Birmingham is a 18 year old female with a history or intolerance to oral iron tablets and also iron liquid ; she has tried iron mixed with juices and has refused to take this medication since 2016. According to her recollection she has had 12 IV iron infusions ; on one occasion she felt hot after the infusion; it was during winter; she also has had sharp pains in her legs.Her last iron infusion was  01/2021.\par Today she feels hot in her hands and her scalp.\par The patient will be assessed for iron therapy and she will be encouraged to eat iron containing food: she eats meat and vegetables spinach.\par The juanetagustín does not require iron infusion today; note low serum ferritin but normal serum iron at 85 and normal HGB with microcytosis. According to patient and mother the patient has thalassemia trait.\par Recommend multivitamins and oral food containing iron. Byron will be seen by WALTER Pete in one month. I have sent a referral for psychology to manage her depression

## 2023-06-07 NOTE — REVIEW OF SYSTEMS
[Abdominal Pain] : abdominal pain [Vomiting] : vomiting [Constipation] : constipation [Anxiety] : anxiety [Depression] : depression [Dysuria] : no dysuria [Incontinence] : no incontinence [Vaginal Discharge] : no vaginal discharge [Confused] : no confusion [Dizziness] : no dizziness [Difficulty Walking] : no difficulty walking [Suicidal] : not suicidal [Insomnia] : no insomnia [FreeTextEntry7] : constant [de-identified] : migraine [de-identified] : she has seen a psychiatry

## 2023-06-07 NOTE — RESULTS/DATA
[FreeTextEntry1] : 05/11/2023 CBC WBC 7.89 HGB 13.3 MCV 79  serum ferritin 10 serum iron 81 iron binding capacity 505 : 16 % saturation\par US abdomen shows gall stones and fatty liver

## 2023-06-07 NOTE — OB HISTORY
[Approximately ___ (Month)] : the LMP was approximately [unfilled] month(s) ago [Frequency: Q ___ days] : menstrual periods occur approximately every [unfilled] days [Menarche Age: ____] : age at menarche was [unfilled] [___] : Living: [unfilled] [Normal Amount/Duration] : was abnormal [Spotting Between  Menses] : no spotting between menses [Regular Cycle Intervals] : periods have been irregular
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
## 2023-06-12 LAB
HGB A MFR BLD: 97.4 %
HGB A2 MFR BLD: 2.6 %
HGB FRACT BLD-IMP: NORMAL

## 2023-07-07 ENCOUNTER — RESULT REVIEW (OUTPATIENT)
Age: 18
End: 2023-07-07

## 2023-07-07 ENCOUNTER — APPOINTMENT (OUTPATIENT)
Dept: HEMATOLOGY ONCOLOGY | Facility: CLINIC | Age: 18
End: 2023-07-07
Payer: COMMERCIAL

## 2023-07-07 VITALS
WEIGHT: 228.17 LBS | DIASTOLIC BLOOD PRESSURE: 75 MMHG | HEART RATE: 80 BPM | RESPIRATION RATE: 16 BRPM | OXYGEN SATURATION: 99 % | TEMPERATURE: 97.2 F | SYSTOLIC BLOOD PRESSURE: 107 MMHG

## 2023-07-07 LAB
BASOPHILS # BLD AUTO: 0.04 K/UL — SIGNIFICANT CHANGE UP (ref 0–0.2)
BASOPHILS NFR BLD AUTO: 0.5 % — SIGNIFICANT CHANGE UP (ref 0–2)
EOSINOPHIL # BLD AUTO: 0.13 K/UL — SIGNIFICANT CHANGE UP (ref 0–0.5)
EOSINOPHIL NFR BLD AUTO: 1.7 % — SIGNIFICANT CHANGE UP (ref 0–6)
HCT VFR BLD CALC: 40.9 % — SIGNIFICANT CHANGE UP (ref 34.5–45)
HGB BLD-MCNC: 13.2 G/DL — SIGNIFICANT CHANGE UP (ref 11.5–15.5)
IMM GRANULOCYTES NFR BLD AUTO: 1 % — HIGH (ref 0–0.9)
LYMPHOCYTES # BLD AUTO: 3.14 K/UL — SIGNIFICANT CHANGE UP (ref 1–3.3)
LYMPHOCYTES # BLD AUTO: 39.9 % — SIGNIFICANT CHANGE UP (ref 13–44)
MCHC RBC-ENTMCNC: 25.1 PG — LOW (ref 27–34)
MCHC RBC-ENTMCNC: 32.3 G/DL — SIGNIFICANT CHANGE UP (ref 32–36)
MCV RBC AUTO: 77.8 FL — LOW (ref 80–100)
MONOCYTES # BLD AUTO: 0.9 K/UL — SIGNIFICANT CHANGE UP (ref 0–0.9)
MONOCYTES NFR BLD AUTO: 11.5 % — SIGNIFICANT CHANGE UP (ref 2–14)
NEUTROPHILS # BLD AUTO: 3.57 K/UL — SIGNIFICANT CHANGE UP (ref 1.8–7.4)
NEUTROPHILS NFR BLD AUTO: 45.4 % — SIGNIFICANT CHANGE UP (ref 43–77)
NRBC # BLD: 0 /100 WBCS — SIGNIFICANT CHANGE UP (ref 0–0)
PLATELET # BLD AUTO: 309 K/UL — SIGNIFICANT CHANGE UP (ref 150–400)
RBC # BLD: 5.26 M/UL — HIGH (ref 3.8–5.2)
RBC # FLD: 13.4 % — SIGNIFICANT CHANGE UP (ref 10.3–14.5)
WBC # BLD: 7.86 K/UL — SIGNIFICANT CHANGE UP (ref 3.8–10.5)
WBC # FLD AUTO: 7.86 K/UL — SIGNIFICANT CHANGE UP (ref 3.8–10.5)

## 2023-07-07 PROCEDURE — 99213 OFFICE O/P EST LOW 20 MIN: CPT

## 2023-07-10 LAB
FERRITIN SERPL-MCNC: 10 NG/ML
FOLATE SERPL-MCNC: >20 NG/ML
IRON SATN MFR SERPL: 13 %
IRON SERPL-MCNC: 64 UG/DL
TIBC SERPL-MCNC: 487 UG/DL
UIBC SERPL-MCNC: 423 UG/DL
VIT B12 SERPL-MCNC: 430 PG/ML

## 2023-07-10 NOTE — RESULTS/DATA
[FreeTextEntry1] : 05/11/2023 CBC WBC 7.89 HGB 13.3 MCV 79  serum ferritin 10 serum iron 81 iron binding capacity 505 : 16 % saturation\par US abdomen shows gall stones and fatty liver\par \par July 7, 2023 - WBC = 7.86, Hgb = 13.2, HCT = 40.9, PLT = 309, MCV = 77.8, \par Ferritin = 10, Iron = 64, TIBC = 487, % Fe Sat = 13%, \par \par

## 2023-07-10 NOTE — ASSESSMENT
[Supportive] : Goals of care discussed with patient: Supportive [Palliative Care Plan] : not applicable at this time [FreeTextEntry1] : Re Birmingham is a 18 year old female with a history or intolerance to oral iron tablets and also iron liquid ; she has tried iron mixed with juices and has refused to take this medication since 2016. According to her recollection she has had 12 IV iron infusions ; on one occasion she felt hot after the infusion; it was during winter; she also has had sharp pains in her legs.Her last iron infusion was  01/2021.\par Today she feels hot in her hands and her scalp.\par The patient will be assessed for iron therapy and she will be encouraged to eat iron containing food: she eats meat and vegetables spinach.\par The patietn does not require iron infusion today; note low serum ferritin but normal serum iron at 85 and normal HGB with microcytosis. According to patient and mother the patient has thalassemia trait.\par Recommend multivitamins and oral food containing iron. Byron will be seen by WALTER Pete in one month. I have sent a referral for psychology to manage her depression\par \par July 7, 2023 - Patient with h/o Obesity, Fatty Liver, Gallstones, GERD / Gastritis, Gastric Erosions, Chronic Constipation, Anxiety / depression, Syncope / dizziness, Menorrhagia on Birth Control, CHRISTIANA s/p multiple IV iron infusions, unable to tolerate PO iron, seen in a f/u visit today. \par Hgb Electrophoresis 6/6/23  - Normal Pattern\par 1) Microcytosis without Anemia - Today's Hgb 13.2, MCV = 77.8 \par Serum Ferritin remains low, with normal Serum Fe levels, Low % Fe Saturation & Elevated TIBC - No indication for IV iron at this time. \par Will recommend Oral Fe 2 tabs PO with Vitamin C, increase intake of dietary iron such as red meat, iron rich foods that she can tolerate. MVI daily. \par 2) f/u w GYN, PCP, Neurology, and other specialists prn at their discretion and recommendations\par All questions, concerns were addressed with patient and mother during this visit, they verbalized understanding. \par RTC prn, \par Case management, care plan d/w Dr. Kye Conti

## 2023-07-10 NOTE — REVIEW OF SYSTEMS
[Abdominal Pain] : abdominal pain [Vomiting] : vomiting [Constipation] : constipation [Anxiety] : anxiety [Depression] : depression [Negative] : Heme/Lymph [Dysuria] : no dysuria [Incontinence] : no incontinence [Vaginal Discharge] : no vaginal discharge [Confused] : no confusion [Dizziness] : no dizziness [Difficulty Walking] : no difficulty walking [Suicidal] : not suicidal [Insomnia] : no insomnia [FreeTextEntry2] : feels tired  [FreeTextEntry7] : constipation intermittent, abd pain and vomiting with iron pills and iron rich vegetables [de-identified] : migraine, shakiness intermittent  [de-identified] : she has seen a psychiatry
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
## 2023-07-10 NOTE — REASON FOR VISIT
[Follow-Up Visit] : a follow-up visit for [Blood Count Assessment] : blood count assessment [Parent] : parent [Other: _____] : [unfilled] [FreeTextEntry2] : history of iron deficiency

## 2023-07-21 ENCOUNTER — RX RENEWAL (OUTPATIENT)
Age: 18
End: 2023-07-21

## 2023-08-07 ENCOUNTER — RX RENEWAL (OUTPATIENT)
Age: 18
End: 2023-08-07

## 2023-08-08 NOTE — ED PEDIATRIC TRIAGE NOTE - CHIEF COMPLAINT QUOTE
Patient states hx of anemia and abnormal periods. Patient states she has had increase in bleeding over the past 2 days, prescribed progesterone and hasn't taken it yet. Mood disorder/Psychotic disorder/PTSD/Alcohol/Substance Use disorders/Cluster B Personality disorder/traits/Conduct problems

## 2023-09-22 ENCOUNTER — RX RENEWAL (OUTPATIENT)
Age: 18
End: 2023-09-22

## 2023-10-24 ENCOUNTER — APPOINTMENT (OUTPATIENT)
Dept: CARDIOLOGY | Facility: CLINIC | Age: 18
End: 2023-10-24
Payer: COMMERCIAL

## 2023-10-24 ENCOUNTER — NON-APPOINTMENT (OUTPATIENT)
Age: 18
End: 2023-10-24

## 2023-10-24 DIAGNOSIS — R00.2 PALPITATIONS: ICD-10-CM

## 2023-10-24 DIAGNOSIS — R00.0 TACHYCARDIA, UNSPECIFIED: ICD-10-CM

## 2023-10-24 PROCEDURE — 93000 ELECTROCARDIOGRAM COMPLETE: CPT

## 2023-10-24 PROCEDURE — 99214 OFFICE O/P EST MOD 30 MIN: CPT | Mod: 25

## 2023-10-25 ENCOUNTER — MESSAGE (OUTPATIENT)
Age: 18
End: 2023-10-25

## 2023-10-25 PROBLEM — R00.0 TACHYCARDIA: Status: ACTIVE | Noted: 2022-03-10

## 2023-10-25 LAB — TSH SERPL-ACNC: 3.84 UIU/ML

## 2023-11-02 ENCOUNTER — RX RENEWAL (OUTPATIENT)
Age: 18
End: 2023-11-02

## 2023-11-25 ENCOUNTER — RX RENEWAL (OUTPATIENT)
Age: 18
End: 2023-11-25

## 2023-12-12 ENCOUNTER — APPOINTMENT (OUTPATIENT)
Dept: CARDIOLOGY | Facility: CLINIC | Age: 18
End: 2023-12-12
Payer: COMMERCIAL

## 2023-12-12 PROCEDURE — 93306 TTE W/DOPPLER COMPLETE: CPT

## 2023-12-13 ENCOUNTER — MESSAGE (OUTPATIENT)
Age: 18
End: 2023-12-13

## 2023-12-18 ENCOUNTER — APPOINTMENT (OUTPATIENT)
Dept: FAMILY MEDICINE | Facility: CLINIC | Age: 18
End: 2023-12-18
Payer: COMMERCIAL

## 2023-12-18 DIAGNOSIS — R05.9 COUGH, UNSPECIFIED: ICD-10-CM

## 2023-12-18 PROCEDURE — 99214 OFFICE O/P EST MOD 30 MIN: CPT | Mod: 95

## 2023-12-18 NOTE — PLAN
[FreeTextEntry1] : Drink plenty of fluids.  May take Advil/Tylenol as needed for pain/fever.  F/u if no improvement noted.   Advised on criteria for return to work after COVID-19 infection:  1. At least 5 days since onset of symptoms.  2. May end isolation at day 5 if substantial improvement noted and mostly resolved symptoms. 3. Fever-free for 24 hours (without the use of antipyretics). Referred to CDC guidelines as well.  Benefits/risks of Paxlovid discussed with patient. Patient chart reviewed including current medications and recent labs. Instructed to f/u with any adverse effects.  Counseling provided on when to go to ER.

## 2023-12-18 NOTE — HISTORY OF PRESENT ILLNESS
[Home] : at home, [unfilled] , at the time of the visit. [Medical Office: ([facility])___] : at the medical office located in  [Verbal consent obtained from patient] : the patient, [unfilled] [FreeTextEntry8] : ALEXANDRO IVORY is a 18 year old female presenting with COVID.  Full body symptoms. Upper resp symptoms, body aches, fatigue, arthralgia.

## 2024-01-11 ENCOUNTER — RX RENEWAL (OUTPATIENT)
Age: 19
End: 2024-01-11

## 2024-01-22 ENCOUNTER — APPOINTMENT (OUTPATIENT)
Dept: FAMILY MEDICINE | Facility: CLINIC | Age: 19
End: 2024-01-22

## 2024-01-23 ENCOUNTER — APPOINTMENT (OUTPATIENT)
Dept: FAMILY MEDICINE | Facility: CLINIC | Age: 19
End: 2024-01-23
Payer: COMMERCIAL

## 2024-01-23 VITALS
SYSTOLIC BLOOD PRESSURE: 112 MMHG | RESPIRATION RATE: 16 BRPM | HEIGHT: 63 IN | TEMPERATURE: 98.2 F | WEIGHT: 225 LBS | HEART RATE: 87 BPM | OXYGEN SATURATION: 97 % | DIASTOLIC BLOOD PRESSURE: 76 MMHG | BODY MASS INDEX: 39.87 KG/M2

## 2024-01-23 DIAGNOSIS — D50.0 IRON DEFICIENCY ANEMIA SECONDARY TO BLOOD LOSS (CHRONIC): ICD-10-CM

## 2024-01-23 DIAGNOSIS — D64.9 ANEMIA, UNSPECIFIED: ICD-10-CM

## 2024-01-23 DIAGNOSIS — Z88.9 ALLERGY STATUS TO UNSPECIFIED DRUGS, MEDICAMENTS AND BIOLOGICAL SUBSTANCES: ICD-10-CM

## 2024-01-23 DIAGNOSIS — E66.9 OBESITY, UNSPECIFIED: ICD-10-CM

## 2024-01-23 DIAGNOSIS — J45.909 UNSPECIFIED ASTHMA, UNCOMPLICATED: ICD-10-CM

## 2024-01-23 DIAGNOSIS — Z76.0 ENCOUNTER FOR ISSUE OF REPEAT PRESCRIPTION: ICD-10-CM

## 2024-01-23 DIAGNOSIS — E55.9 VITAMIN D DEFICIENCY, UNSPECIFIED: ICD-10-CM

## 2024-01-23 PROCEDURE — 99214 OFFICE O/P EST MOD 30 MIN: CPT | Mod: 25

## 2024-01-23 RX ORDER — CETIRIZINE HYDROCHLORIDE 10 MG/1
10 TABLET, COATED ORAL DAILY
Qty: 90 | Refills: 1 | Status: ACTIVE | COMMUNITY
Start: 2018-12-12 | End: 1900-01-01

## 2024-01-23 RX ORDER — SODIUM CHLORIDE FOR INHALATION 0.9 %
0.9 VIAL, NEBULIZER (ML) INHALATION
Qty: 4 | Refills: 5 | Status: ACTIVE | COMMUNITY
Start: 2021-12-08 | End: 1900-01-01

## 2024-01-23 RX ORDER — ERGOCALCIFEROL 1.25 MG/1
1.25 MG CAPSULE, LIQUID FILLED ORAL
Qty: 12 | Refills: 3 | Status: ACTIVE | COMMUNITY
Start: 2021-07-07 | End: 1900-01-01

## 2024-01-23 RX ORDER — NIRMATRELVIR AND RITONAVIR 300-100 MG
20 X 150 MG & KIT ORAL
Qty: 1 | Refills: 0 | Status: DISCONTINUED | COMMUNITY
Start: 2023-12-18 | End: 2024-01-23

## 2024-01-23 RX ORDER — ALBUTEROL SULFATE 2.5 MG/3ML
(2.5 MG/3ML) SOLUTION RESPIRATORY (INHALATION)
Qty: 1 | Refills: 2 | Status: ACTIVE | COMMUNITY
Start: 2021-12-06 | End: 1900-01-01

## 2024-01-23 RX ORDER — OMEPRAZOLE 10 MG/1
10 CAPSULE, DELAYED RELEASE ORAL
Qty: 30 | Refills: 2 | Status: DISCONTINUED | COMMUNITY
Start: 2021-08-16 | End: 2024-01-23

## 2024-01-23 NOTE — HISTORY OF PRESENT ILLNESS
[FreeTextEntry1] : needs med refills; c/o recurrent body rash that comes and goes; but relieved w/ ceterizine here w/ mom

## 2024-01-29 ENCOUNTER — APPOINTMENT (OUTPATIENT)
Dept: NEUROLOGY | Facility: CLINIC | Age: 19
End: 2024-01-29
Payer: COMMERCIAL

## 2024-01-29 VITALS
BODY MASS INDEX: 39.87 KG/M2 | SYSTOLIC BLOOD PRESSURE: 111 MMHG | HEART RATE: 100 BPM | HEIGHT: 63 IN | DIASTOLIC BLOOD PRESSURE: 72 MMHG | WEIGHT: 225 LBS

## 2024-01-29 PROCEDURE — 99204 OFFICE O/P NEW MOD 45 MIN: CPT

## 2024-01-29 RX ORDER — RIZATRIPTAN BENZOATE 10 MG/1
TABLET ORAL
Refills: 0 | Status: DISCONTINUED | COMMUNITY
End: 2024-01-29

## 2024-01-29 NOTE — DATA REVIEWED
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 Moran reviewed, recommended MRI brain, carotid ultrasound, rizatriptan PRN, EMG/NCV of bilateral lower extremities

## 2024-01-29 NOTE — HISTORY OF PRESENT ILLNESS
[FreeTextEntry1] : 18-year-old woman presenting for evaluation of headaches and leg weakness.  She had a concussion several years ago and saw Dr. Galvez at that time.  MRI showed an incidental pineal cyst but was otherwise unremarkable.  Since then she has had fluctuating headaches and currently has had one for the past 2 weeks.  Headaches are pressure-like, often bifrontal but can migrate to any part of the head.  No vision change, photophobia, phonophobia.  Has some nausea but no vomiting.  Saw Dr. Stef Moran in the fall who prescribed rizatriptan but this did not help.  She also reports that for the past year both legs have felt weak and when standing she sometimes falls to the side for no clear reason.  No numbness or tingling.  Dr. Moran did an EMG but she is unsure of results.  Of note she also has sinus tachycardia for which she is taking metoprolol.  Follows with Dr. Jerez.

## 2024-01-29 NOTE — PHYSICAL EXAM
[FreeTextEntry1] : Physical Exam Constitutional: no apparent distress Psychiatric: normal affect, euthymic, alert and oriented x 3  Neurologic Exam: Mental Status: awake and alert, speech fluent and prosodic with no paraphasic errors Cranial Nerves: I: deferred; II: pupils equal round and reactive, visual fields full to confrontation, III, IV, VI: extraocular movements full with no nystagmus; V: facial sensation intact and symmetric; VII: facial power symmetric; VIII: hearing intact to finger rub; IX/X: no dysarthria; XI: shoulder shrug symmetric; XII: tongue protrudes midline Motor: normal bulk and tone, no orbiting or pronator drift, power 5/5 to confrontation throughout including shoulder abduction, elbow flexion and extension, wrist flexion and extension, hip flexion, knee flexion and extension, no abnormal movements Sensation: intact to light touch in distal upper and lower extremities bilaterally Coordination: finger-nose-finger intact bilaterally Gait: narrow base, normal stance and stride, normal arm swing

## 2024-01-29 NOTE — ASSESSMENT
[FreeTextEntry1] : 1) Post-concussion headache syndrome -Recommend magnesium, riboflavin, sleep hygiene, hydration, avoiding caffeine and alcohol -If this is ineffective can consider prophylactic medication (nortriptyline, topiramate)  2) Lower extremity weakness -Strength full on today's exam -Will review outside EMG

## 2024-02-01 ENCOUNTER — APPOINTMENT (OUTPATIENT)
Dept: NEUROLOGY | Facility: CLINIC | Age: 19
End: 2024-02-01
Payer: COMMERCIAL

## 2024-02-01 PROCEDURE — 99214 OFFICE O/P EST MOD 30 MIN: CPT

## 2024-02-01 RX ORDER — ALBUTEROL SULFATE 90 UG/1
108 (90 BASE) INHALANT RESPIRATORY (INHALATION)
Qty: 1 | Refills: 2 | Status: DISCONTINUED | COMMUNITY
Start: 2022-02-07 | End: 2024-02-01

## 2024-02-01 RX ORDER — ALBUTEROL SULFATE 90 UG/1
108 (90 BASE) INHALANT RESPIRATORY (INHALATION)
Qty: 1 | Refills: 0 | Status: DISCONTINUED | COMMUNITY
Start: 2023-12-18 | End: 2024-02-01

## 2024-02-01 NOTE — ASSESSMENT
[FreeTextEntry1] : 1) Post-concussion headache syndrome -Recent MRI unremarkable other than known pineal cyst -Recent carotid and transcranial ultrasounds unremarkable -No additional imaging indicated at this time -Recommend magnesium, riboflavin, sleep hygiene, hydration, avoiding caffeine and alcohol -If this is ineffective can consider prophylactic medication (nortriptyline, topiramate)  2) Lower extremity weakness -Strength full on exam earlier this week -EMG/NCV normal -No additional work up indicated at this time -Recommend exercise to increase strength and endurance  RTC 6 months

## 2024-02-01 NOTE — REASON FOR VISIT
[Home] : at home, [unfilled] , at the time of the visit. [Medical Office: ([facility])___] : at the medical office located in  [Follow-Up: _____] : a [unfilled] follow-up visit

## 2024-02-27 ENCOUNTER — APPOINTMENT (OUTPATIENT)
Dept: FAMILY MEDICINE | Facility: CLINIC | Age: 19
End: 2024-02-27
Payer: COMMERCIAL

## 2024-02-27 VITALS
TEMPERATURE: 99 F | HEIGHT: 63 IN | RESPIRATION RATE: 16 BRPM | OXYGEN SATURATION: 98 % | HEART RATE: 93 BPM | SYSTOLIC BLOOD PRESSURE: 107 MMHG | BODY MASS INDEX: 40.32 KG/M2 | WEIGHT: 227.56 LBS | DIASTOLIC BLOOD PRESSURE: 72 MMHG

## 2024-02-27 DIAGNOSIS — U07.1 COVID-19: ICD-10-CM

## 2024-02-27 DIAGNOSIS — R12 HEARTBURN: ICD-10-CM

## 2024-02-27 DIAGNOSIS — Z71.3 DIETARY COUNSELING AND SURVEILLANCE: ICD-10-CM

## 2024-02-27 DIAGNOSIS — E66.01 MORBID (SEVERE) OBESITY DUE TO EXCESS CALORIES: ICD-10-CM

## 2024-02-27 DIAGNOSIS — N64.89 OTHER SPECIFIED DISORDERS OF BREAST: ICD-10-CM

## 2024-02-27 DIAGNOSIS — M54.9 DORSALGIA, UNSPECIFIED: ICD-10-CM

## 2024-02-27 DIAGNOSIS — F41.8 OTHER SPECIFIED ANXIETY DISORDERS: ICD-10-CM

## 2024-02-27 DIAGNOSIS — F40.10 SOCIAL PHOBIA, UNSPECIFIED: ICD-10-CM

## 2024-02-27 DIAGNOSIS — G89.29 DORSALGIA, UNSPECIFIED: ICD-10-CM

## 2024-02-27 DIAGNOSIS — Z00.00 ENCOUNTER FOR GENERAL ADULT MEDICAL EXAMINATION W/OUT ABNORMAL FINDINGS: ICD-10-CM

## 2024-02-27 DIAGNOSIS — K25.9 GASTRIC ULCER, UNSPECIFIED AS ACUTE OR CHRONIC, W/OUT HEMORRHAGE OR PERFORATION: ICD-10-CM

## 2024-02-27 DIAGNOSIS — S06.0X0A CONCUSSION W/OUT LOSS OF CONSCIOUSNESS, INITIAL ENCOUNTER: ICD-10-CM

## 2024-02-27 PROCEDURE — 99395 PREV VISIT EST AGE 18-39: CPT

## 2024-02-27 RX ORDER — NORETHINDRONE ACETATE AND ETHINYL ESTRADIOL AND FERROUS FUMARATE 1MG-20(21)
1-20 KIT ORAL
Refills: 0 | Status: ACTIVE | COMMUNITY

## 2024-02-27 RX ORDER — KETOTIFEN FUMARATE 0.25 MG/ML
0.03 SOLUTION/ DROPS OPHTHALMIC
Refills: 0 | Status: ACTIVE | COMMUNITY
Start: 2019-03-15

## 2024-02-27 RX ORDER — POLYVINYL ALCOHOL 14 MG/ML
1.4 SOLUTION/ DROPS OPHTHALMIC
Qty: 15 | Refills: 0 | Status: ACTIVE | COMMUNITY
Start: 2019-02-14

## 2024-02-27 RX ORDER — BENZONATATE 200 MG/1
200 CAPSULE ORAL 3 TIMES DAILY
Qty: 15 | Refills: 0 | Status: DISCONTINUED | COMMUNITY
Start: 2023-12-18 | End: 2024-02-27

## 2024-02-27 RX ORDER — VENLAFAXINE HYDROCHLORIDE 37.5 MG/1
37.5 CAPSULE, EXTENDED RELEASE ORAL DAILY
Qty: 60 | Refills: 1 | Status: ACTIVE | COMMUNITY
Start: 2024-02-27 | End: 1900-01-01

## 2024-02-27 RX ORDER — SODIUM FLUORIDE1.1%, POTASSIUM NITRATE 5% 5.8; 57.5 MG/ML; MG/ML
1.1-5 GEL, DENTIFRICE DENTAL
Qty: 100 | Refills: 0 | Status: ACTIVE | COMMUNITY
Start: 2023-10-24

## 2024-02-27 NOTE — COUNSELING
[Encouraged to increase physical activity] : Encouraged to increase physical activity [Benefits of weight loss discussed] : Benefits of weight loss discussed [Needs reinforcement, provided] : Patient needs reinforcement on understanding of disease, goals and obesity follow-up plan; reinforcement was provided [Decrease Portions] : decrease portions

## 2024-02-27 NOTE — HISTORY OF PRESENT ILLNESS
[Parent] : parent [Family Member] : family member [FreeTextEntry1] : 18 year old female who presents today for a complete physical exam. here w/ mom and twin sister c/o heartburn, worse w/ recent pizza consumption c/o chronic back pain due to large breasts, would consider reduction c/o chronic anxiety, worse w/ doctor appointments, used to see therapist, but felt it didn't help

## 2024-02-27 NOTE — HEALTH RISK ASSESSMENT
[Good] : ~his/her~ current health as good [Fair] :  ~his/her~ mood as fair [No] : In the past 12 months have you used drugs other than those required for medical reasons? No [1] : 1) Little interest or pleasure doing things for several days (1) [3] : 2) Feeling down, depressed, or hopeless for nearly every day (3) [With Family] : lives with family [# of Members in Household ___] :  household currently consist of [unfilled] member(s) [Student] : student [College] : College [Single] : single [Fully functional (bathing, dressing, toileting, transferring, walking, feeding)] : Fully functional (bathing, dressing, toileting, transferring, walking, feeding) [Fully functional (using the telephone, shopping, preparing meals, housekeeping, doing laundry, using] : Fully functional and needs no help or supervision to perform IADLs (using the telephone, shopping, preparing meals, housekeeping, doing laundry, using transportation, managing medications and managing finances) [Never] : Never [de-identified] : eats once a day, chow mein, pizza [de-identified] : biking [Change in mental status noted] : No change in mental status noted [Language] : denies difficulty with language [Handling Complex Tasks] : denies difficulty handling complex tasks [Sexually Active] : not sexually active [Reports changes in hearing] : Reports no changes in hearing [Reports changes in vision] : Reports no changes in vision [Reports changes in dental health] : Reports no changes in dental health [de-identified] : sister, 2 brothers, parents [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

## 2024-02-27 NOTE — PLAN
[FreeTextEntry1] : deferring flu vx and psych f/u reduce acidic and fatty foods consider f/u w/ plastic surgeon and ortho healthy eating habits enforced pt will do bw w/ neuro in the coming wks allowed to vent, emotional support provided start venlafaxine f/u in 4-6 wks

## 2024-02-27 NOTE — PHYSICAL EXAM
[No Acute Distress] : no acute distress [Well-Appearing] : well-appearing [Normal Sclera/Conjunctiva] : normal sclera/conjunctiva [PERRL] : pupils equal round and reactive to light [EOMI] : extraocular movements intact [Normal Outer Ear/Nose] : the outer ears and nose were normal in appearance [Normal Oropharynx] : the oropharynx was normal [Normal TMs] : both tympanic membranes were normal [No JVD] : no jugular venous distention [No Lymphadenopathy] : no lymphadenopathy [Supple] : supple [Thyroid Normal, No Nodules] : the thyroid was normal and there were no nodules present [No Respiratory Distress] : no respiratory distress  [No Accessory Muscle Use] : no accessory muscle use [Clear to Auscultation] : lungs were clear to auscultation bilaterally [Normal Rate] : normal rate  [Regular Rhythm] : with a regular rhythm [Normal S1, S2] : normal S1 and S2 [No Murmur] : no murmur heard [No Abdominal Bruit] : a ~M bruit was not heard ~T in the abdomen [No Varicosities] : no varicosities [Pedal Pulses Present] : the pedal pulses are present [No Edema] : there was no peripheral edema [No Palpable Aorta] : no palpable aorta [No Extremity Clubbing/Cyanosis] : no extremity clubbing/cyanosis [Normal Appearance] : normal in appearance [No Nipple Discharge] : no nipple discharge [No Axillary Lymphadenopathy] : no axillary lymphadenopathy [Soft] : abdomen soft [Non Tender] : non-tender [Non-distended] : non-distended [No Masses] : no abdominal mass palpated [Normal Bowel Sounds] : normal bowel sounds [No HSM] : no HSM [Normal Supraclavicular Nodes] : no supraclavicular lymphadenopathy [Normal Axillary Nodes] : no axillary lymphadenopathy [Normal Posterior Cervical Nodes] : no posterior cervical lymphadenopathy [Normal Anterior Cervical Nodes] : no anterior cervical lymphadenopathy [No CVA Tenderness] : no CVA  tenderness [No Spinal Tenderness] : no spinal tenderness [No Joint Swelling] : no joint swelling [Grossly Normal Strength/Tone] : grossly normal strength/tone [No Rash] : no rash [Coordination Grossly Intact] : coordination grossly intact [No Focal Deficits] : no focal deficits [Normal Gait] : normal gait [Normal Insight/Judgement] : insight and judgment were intact [Normal Affect] : the affect was normal [de-identified] : obese [de-identified] : pendulous breasts

## 2024-03-18 ENCOUNTER — RX RENEWAL (OUTPATIENT)
Age: 19
End: 2024-03-18

## 2024-03-18 RX ORDER — METOPROLOL TARTRATE 25 MG/1
25 TABLET, FILM COATED ORAL TWICE DAILY
Qty: 180 | Refills: 2 | Status: ACTIVE | COMMUNITY
Start: 2023-11-10 | End: 1900-01-01

## 2024-04-22 ENCOUNTER — RX RENEWAL (OUTPATIENT)
Age: 19
End: 2024-04-22

## 2024-04-22 RX ORDER — OMEPRAZOLE 20 MG/1
20 CAPSULE, DELAYED RELEASE ORAL
Qty: 90 | Refills: 0 | Status: ACTIVE | COMMUNITY
Start: 2023-03-24 | End: 1900-01-01

## 2024-06-23 NOTE — DATA REVIEWED
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

## 2024-07-22 ENCOUNTER — RX RENEWAL (OUTPATIENT)
Age: 19
End: 2024-07-22

## 2024-07-30 ENCOUNTER — NON-APPOINTMENT (OUTPATIENT)
Age: 19
End: 2024-07-30

## 2024-08-12 ENCOUNTER — APPOINTMENT (OUTPATIENT)
Dept: ORTHOPEDIC SURGERY | Facility: CLINIC | Age: 19
End: 2024-08-12

## 2024-08-12 VITALS
HEIGHT: 63 IN | WEIGHT: 220 LBS | HEART RATE: 85 BPM | SYSTOLIC BLOOD PRESSURE: 112 MMHG | BODY MASS INDEX: 38.98 KG/M2 | OXYGEN SATURATION: 98 % | DIASTOLIC BLOOD PRESSURE: 80 MMHG

## 2024-08-12 DIAGNOSIS — S99.911A UNSPECIFIED INJURY OF RIGHT ANKLE, INITIAL ENCOUNTER: ICD-10-CM

## 2024-08-12 PROCEDURE — 99203 OFFICE O/P NEW LOW 30 MIN: CPT

## 2024-09-05 ENCOUNTER — RX RENEWAL (OUTPATIENT)
Age: 19
End: 2024-09-05

## 2024-10-08 ENCOUNTER — APPOINTMENT (OUTPATIENT)
Dept: INTERNAL MEDICINE | Facility: CLINIC | Age: 19
End: 2024-10-08

## 2024-10-08 PROCEDURE — 99203 OFFICE O/P NEW LOW 30 MIN: CPT | Mod: 95

## 2024-10-11 ENCOUNTER — TRANSCRIPTION ENCOUNTER (OUTPATIENT)
Age: 19
End: 2024-10-11

## 2024-10-16 ENCOUNTER — APPOINTMENT (OUTPATIENT)
Dept: FAMILY MEDICINE | Facility: CLINIC | Age: 19
End: 2024-10-16
Payer: COMMERCIAL

## 2024-10-16 DIAGNOSIS — R42 DIZZINESS AND GIDDINESS: ICD-10-CM

## 2024-10-16 DIAGNOSIS — N92.0 EXCESSIVE AND FREQUENT MENSTRUATION WITH REGULAR CYCLE: ICD-10-CM

## 2024-10-16 DIAGNOSIS — R53.82 CHRONIC FATIGUE, UNSPECIFIED: ICD-10-CM

## 2024-10-16 PROCEDURE — 99213 OFFICE O/P EST LOW 20 MIN: CPT

## 2024-10-17 ENCOUNTER — OUTPATIENT (OUTPATIENT)
Dept: OUTPATIENT SERVICES | Facility: HOSPITAL | Age: 19
LOS: 1 days | Discharge: HOME | End: 2024-10-17

## 2024-10-17 DIAGNOSIS — Z90.89 ACQUIRED ABSENCE OF OTHER ORGANS: Chronic | ICD-10-CM

## 2024-10-17 DIAGNOSIS — D50.9 IRON DEFICIENCY ANEMIA, UNSPECIFIED: ICD-10-CM

## 2024-10-23 ENCOUNTER — RESULT REVIEW (OUTPATIENT)
Age: 19
End: 2024-10-23

## 2024-10-23 ENCOUNTER — APPOINTMENT (OUTPATIENT)
Dept: HEMATOLOGY ONCOLOGY | Facility: CLINIC | Age: 19
End: 2024-10-23
Payer: COMMERCIAL

## 2024-10-23 VITALS
WEIGHT: 222.43 LBS | HEIGHT: 63 IN | HEART RATE: 84 BPM | RESPIRATION RATE: 16 BRPM | DIASTOLIC BLOOD PRESSURE: 74 MMHG | BODY MASS INDEX: 39.41 KG/M2 | OXYGEN SATURATION: 97 % | TEMPERATURE: 97.7 F | SYSTOLIC BLOOD PRESSURE: 103 MMHG

## 2024-10-23 DIAGNOSIS — D64.9 ANEMIA, UNSPECIFIED: ICD-10-CM

## 2024-10-23 DIAGNOSIS — D50.0 IRON DEFICIENCY ANEMIA SECONDARY TO BLOOD LOSS (CHRONIC): ICD-10-CM

## 2024-10-23 DIAGNOSIS — D50.8 OTHER IRON DEFICIENCY ANEMIAS: ICD-10-CM

## 2024-10-23 DIAGNOSIS — N92.0 EXCESSIVE AND FREQUENT MENSTRUATION WITH REGULAR CYCLE: ICD-10-CM

## 2024-10-23 LAB
BASOPHILS # BLD AUTO: 0.04 K/UL — SIGNIFICANT CHANGE UP (ref 0–0.2)
BASOPHILS NFR BLD AUTO: 0.6 % — SIGNIFICANT CHANGE UP (ref 0–2)
EOSINOPHIL # BLD AUTO: 0.14 K/UL — SIGNIFICANT CHANGE UP (ref 0–0.5)
EOSINOPHIL NFR BLD AUTO: 2 % — SIGNIFICANT CHANGE UP (ref 0–6)
HCT VFR BLD CALC: 39.9 % — SIGNIFICANT CHANGE UP (ref 34.5–45)
HGB BLD-MCNC: 12.6 G/DL — SIGNIFICANT CHANGE UP (ref 11.5–15.5)
IMM GRANULOCYTES NFR BLD AUTO: 0.1 % — SIGNIFICANT CHANGE UP (ref 0–0.9)
LYMPHOCYTES # BLD AUTO: 2.85 K/UL — SIGNIFICANT CHANGE UP (ref 1–3.3)
LYMPHOCYTES # BLD AUTO: 40.4 % — SIGNIFICANT CHANGE UP (ref 13–44)
MCHC RBC-ENTMCNC: 24.5 PG — LOW (ref 27–34)
MCHC RBC-ENTMCNC: 31.6 G/DL — LOW (ref 32–36)
MCV RBC AUTO: 77.5 FL — LOW (ref 80–100)
MONOCYTES # BLD AUTO: 0.57 K/UL — SIGNIFICANT CHANGE UP (ref 0–0.9)
MONOCYTES NFR BLD AUTO: 8.1 % — SIGNIFICANT CHANGE UP (ref 2–14)
NEUTROPHILS # BLD AUTO: 3.44 K/UL — SIGNIFICANT CHANGE UP (ref 1.8–7.4)
NEUTROPHILS NFR BLD AUTO: 48.8 % — SIGNIFICANT CHANGE UP (ref 43–77)
NRBC # BLD: 0 /100 WBCS — SIGNIFICANT CHANGE UP (ref 0–0)
PLATELET # BLD AUTO: 340 K/UL — SIGNIFICANT CHANGE UP (ref 150–400)
RBC # BLD: 5.15 M/UL — SIGNIFICANT CHANGE UP (ref 3.8–5.2)
RBC # FLD: 13.2 % — SIGNIFICANT CHANGE UP (ref 10.3–14.5)
RETICS #: 60.8 K/UL — SIGNIFICANT CHANGE UP (ref 25–125)
RETICS/RBC NFR: 1.2 % — SIGNIFICANT CHANGE UP (ref 0.5–2.5)
WBC # BLD: 7.05 K/UL — SIGNIFICANT CHANGE UP (ref 3.8–10.5)
WBC # FLD AUTO: 7.05 K/UL — SIGNIFICANT CHANGE UP (ref 3.8–10.5)

## 2024-10-23 PROCEDURE — G2211 COMPLEX E/M VISIT ADD ON: CPT | Mod: NC

## 2024-10-23 PROCEDURE — 99215 OFFICE O/P EST HI 40 MIN: CPT

## 2024-10-23 RX ORDER — MULTIVIT-MIN/FOLIC/VIT K/LYCOP 400-300MCG
250 TABLET ORAL
Qty: 15 | Refills: 3 | Status: ACTIVE | COMMUNITY
Start: 2024-10-23 | End: 1900-01-01

## 2024-10-23 RX ORDER — IRON POLYSACCHARIDE COMPLEX 150 MG
150 CAPSULE ORAL
Qty: 90 | Refills: 2 | Status: ACTIVE | COMMUNITY
Start: 2024-10-23 | End: 1900-01-01

## 2024-10-23 RX ORDER — COLD-HOT PACK
50 EACH MISCELLANEOUS
Qty: 45 | Refills: 2 | Status: ACTIVE | COMMUNITY
Start: 2024-10-23 | End: 1900-01-01

## 2024-10-29 ENCOUNTER — APPOINTMENT (OUTPATIENT)
Dept: INFUSION THERAPY | Facility: HOSPITAL | Age: 19
End: 2024-10-29

## 2024-11-05 ENCOUNTER — APPOINTMENT (OUTPATIENT)
Dept: INFUSION THERAPY | Facility: HOSPITAL | Age: 19
End: 2024-11-05

## 2024-11-12 ENCOUNTER — APPOINTMENT (OUTPATIENT)
Dept: INFUSION THERAPY | Facility: HOSPITAL | Age: 19
End: 2024-11-12

## 2024-11-19 ENCOUNTER — RX RENEWAL (OUTPATIENT)
Age: 19
End: 2024-11-19

## 2024-11-19 ENCOUNTER — APPOINTMENT (OUTPATIENT)
Dept: INFUSION THERAPY | Facility: HOSPITAL | Age: 19
End: 2024-11-19

## 2024-11-20 ENCOUNTER — APPOINTMENT (OUTPATIENT)
Dept: CARDIOLOGY | Facility: CLINIC | Age: 19
End: 2024-11-20
Payer: COMMERCIAL

## 2024-11-20 VITALS
TEMPERATURE: 98.3 F | DIASTOLIC BLOOD PRESSURE: 75 MMHG | SYSTOLIC BLOOD PRESSURE: 113 MMHG | OXYGEN SATURATION: 98 % | BODY MASS INDEX: 38.98 KG/M2 | HEIGHT: 63 IN | RESPIRATION RATE: 15 BRPM | WEIGHT: 220 LBS | HEART RATE: 89 BPM

## 2024-11-20 DIAGNOSIS — R00.0 TACHYCARDIA, UNSPECIFIED: ICD-10-CM

## 2024-11-20 DIAGNOSIS — R00.2 PALPITATIONS: ICD-10-CM

## 2024-11-20 PROCEDURE — 99214 OFFICE O/P EST MOD 30 MIN: CPT

## 2024-11-20 PROCEDURE — G2211 COMPLEX E/M VISIT ADD ON: CPT | Mod: NC

## 2025-01-17 ENCOUNTER — OUTPATIENT (OUTPATIENT)
Dept: OUTPATIENT SERVICES | Facility: HOSPITAL | Age: 20
LOS: 1 days | Discharge: HOME | End: 2025-01-17

## 2025-01-17 DIAGNOSIS — Z90.89 ACQUIRED ABSENCE OF OTHER ORGANS: Chronic | ICD-10-CM

## 2025-01-17 DIAGNOSIS — D50.9 IRON DEFICIENCY ANEMIA, UNSPECIFIED: ICD-10-CM

## 2025-01-22 ENCOUNTER — APPOINTMENT (OUTPATIENT)
Dept: HEMATOLOGY ONCOLOGY | Facility: CLINIC | Age: 20
End: 2025-01-22

## 2025-01-22 ENCOUNTER — NON-APPOINTMENT (OUTPATIENT)
Age: 20
End: 2025-01-22

## 2025-01-22 DIAGNOSIS — E61.1 IRON DEFICIENCY: ICD-10-CM

## 2025-01-22 DIAGNOSIS — D64.9 ANEMIA, UNSPECIFIED: ICD-10-CM

## 2025-02-04 ENCOUNTER — RX RENEWAL (OUTPATIENT)
Age: 20
End: 2025-02-04

## 2025-02-25 ENCOUNTER — NON-APPOINTMENT (OUTPATIENT)
Age: 20
End: 2025-02-25

## 2025-02-26 ENCOUNTER — APPOINTMENT (OUTPATIENT)
Dept: UROLOGY | Facility: CLINIC | Age: 20
End: 2025-02-26

## 2025-03-10 ENCOUNTER — APPOINTMENT (OUTPATIENT)
Dept: ORTHOPEDIC SURGERY | Facility: CLINIC | Age: 20
End: 2025-03-10
Payer: COMMERCIAL

## 2025-03-10 DIAGNOSIS — M25.572 PAIN IN LEFT ANKLE AND JOINTS OF LEFT FOOT: ICD-10-CM

## 2025-03-10 DIAGNOSIS — M67.88 OTHER SPECIFIED DISORDERS OF SYNOVIUM AND TENDON, OTHER SITE: ICD-10-CM

## 2025-03-10 DIAGNOSIS — S99.911A UNSPECIFIED INJURY OF RIGHT ANKLE, INITIAL ENCOUNTER: ICD-10-CM

## 2025-03-10 PROCEDURE — 99213 OFFICE O/P EST LOW 20 MIN: CPT

## 2025-03-10 PROCEDURE — 73610 X-RAY EXAM OF ANKLE: CPT | Mod: LT

## 2025-05-02 ENCOUNTER — RX RENEWAL (OUTPATIENT)
Age: 20
End: 2025-05-02

## 2025-06-10 ENCOUNTER — RX RENEWAL (OUTPATIENT)
Age: 20
End: 2025-06-10

## 2025-07-08 ENCOUNTER — RX RENEWAL (OUTPATIENT)
Age: 20
End: 2025-07-08

## 2025-08-04 ENCOUNTER — APPOINTMENT (OUTPATIENT)
Dept: FAMILY MEDICINE | Facility: CLINIC | Age: 20
End: 2025-08-04

## 2025-08-12 ENCOUNTER — RX RENEWAL (OUTPATIENT)
Age: 20
End: 2025-08-12

## 2025-09-09 ENCOUNTER — RX RENEWAL (OUTPATIENT)
Age: 20
End: 2025-09-09